# Patient Record
(demographics unavailable — no encounter records)

---

## 2024-10-31 NOTE — REASON FOR VISIT
[Initial Evaluation] : an initial evaluation for [Parents] : parents [[employer] ] : provided by [employer]   [FreeTextEntry2] : sleep apnea [Interpreters_IDNumber] : [id number]  [Interpreters_FullName] : Kaya  [TWNoteComboBox1] : Alvin

## 2024-10-31 NOTE — HISTORY OF PRESENT ILLNESS
[No Personal or Family History of Easy Bruising, Bleeding, or Issues with General Anesthesia] : No Personal or Family History of easy bruising, bleeding, or issues with general anesthesia [de-identified] : 10-31-24 12 year old female presents with sleep apnea  Referred by PULM Dr.Kristen Vaca-Maggie  States snoring at night, even in character, with pausing, gasping or choking.  Reports constant nasal congestion and mouth breathing  Has tried flonase for one month with no relief Denies witnessed apnea at night when sleeping.  There ARE concerns with enuresis.  There is no difficulty with hyperactivity/concentration.  Sleep study conducted 10/17/24 AHI 11.3 Thalassemia with mom and with patient.   CT sinuses 06/26/23 ([facility]) - inflammatory changes affecting the paranasal sinuses and their respective drainage pathways. Nasal septal deviation. Anatomic varients which may predisopose to pansinusitis.

## 2024-10-31 NOTE — REVIEW OF SYSTEMS
[Negative] : Heme/Lymph [de-identified] : as per HPI  [de-identified] : as per HPI  [de-identified] : as per HPI

## 2024-10-31 NOTE — ASSESSMENT
[FreeTextEntry1] : 12 year female Snoring and ATH on exam and MOHSEN on PSG with AHI  11.3  .  Discussed snoring vs UARS vs SDB vs MOHSEN.  Discussed that primary snoring is not harmful in and off itself but sleep apnea is different.  Although sometimes kids may grow out of MOHSEN, we recommend treating due to long term risk of quality of life issues, learning issues and, in severe cases, heart and lung problems.  Given current symptoms, findings on PSG and patient otherwise healthy would recommend considering adenotonsillectomy.   Discussed MOHSEN and risks, alternatives, and benefits of adenotonsillectomy including observation or CPAP.  Risks of adenotonsillectomy discussed including, but not limited to, bleeding, infection, scarring, voice changes, pain, dehydration, persistence of sleep apnea, and regrowth of adenoids.  Briefly discussed risk of anesthesia but they will discuss more in depth with the anesthesiologist the day of the procedure.  Parent agreed to proceed to surgery and this will be scheduled accordingly.  Sinus findings on CT mild. Headaches occasional.  No sinus surgery for now.  Will address ATH first.     Referral to [facility] for beta-thalassemia.   Plan: Tonsillectomy and Adenoidectomy (16144) [facility] OR Observation d/t Rodriguez Heme and PST clearance d/t severe MOHSEN and beta thalassemia.  RTC 3 months post op

## 2024-10-31 NOTE — PHYSICAL EXAM
[3+] : 3+ [Normal Gait and Station] : normal gait and station [Normal muscle strength, symmetry and tone of facial, head and neck musculature] : normal muscle strength, symmetry and tone of facial, head and neck musculature [Normal] : no cervical lymphadenopathy [Exposed Vessel] : left anterior vessel not exposed [Wheezing] : no wheezing [Increased Work of Breathing] : no increased work of breathing with use of accessory muscles and retractions

## 2024-10-31 NOTE — CONSULT LETTER
[Dear  ___] : Dear  [unfilled], [Consult Letter:] : I had the pleasure of evaluating your patient, [unfilled]. [Please see my note below.] : Please see my note below. [Consult Closing:] : Thank you very much for allowing me to participate in the care of this patient.  If you have any questions, please do not hesitate to contact me. [Sincerely,] : Sincerely, [FreeTextEntry2] : .Karissa Laura MD [FreeTextEntry3] : Luis Casillas MD  Pediatric Otolaryngology/ Head & Neck Surgery  [facility]  [address] [place] [address]  Tel [phone]  Fax [phone]

## 2024-12-02 NOTE — REASON FOR VISIT
[Routine Follow-Up] : a routine follow-up visit for [Sleep Apnea] : sleep apnea [Patient] : patient [Mother] : mother [Medical Records] : medical records [Patient Declined  Services] : - None: Patient declined  services

## 2024-12-02 NOTE — REVIEW OF SYSTEMS
[NI] : Allergic [Nl] : Endocrine [Snoring] : snoring [Anemia] : anemia [Short Stature] : short stature was noted [FreeTextEntry6] : asthma  [de-identified] : no menarche yet [de-identified] : on iron [de-identified] : on vit D, mother 4'10, dad 5'6

## 2024-12-02 NOTE — PHYSICAL EXAM
[Well Nourished] : well nourished [Well Developed] : well developed [Alert] : ~L alert [Active] : active [Normal Breathing Pattern] : normal breathing pattern [No Respiratory Distress] : no respiratory distress [No Allergic Shiners] : no allergic shiners [No Drainage] : no drainage [No Conjunctivitis] : no conjunctivitis [No Nasal Drainage] : no nasal drainage [No Sinus Tenderness] : no sinus tenderness [No Oral Pallor] : no oral pallor [No Oral Cyanosis] : no oral cyanosis [Non-Erythematous] : non-erythematous [No Exudates] : no exudates [Tonsil Size ___] : tonsil size [unfilled] [Absence Of Retractions] : absence of retractions [Symmetric] : symmetric [Good Expansion] : good expansion [No Acc Muscle Use] : no accessory muscle use [Good aeration to bases] : good aeration to bases [Equal Breath Sounds] : equal breath sounds bilaterally [No Crackles] : no crackles [No Rhonchi] : no rhonchi [No Wheezing] : no wheezing [Normal Sinus Rhythm] : normal sinus rhythm [No Heart Murmur] : no heart murmur [Soft, Non-Tender] : soft, non-tender [Non Distended] : was not ~L distended [Full ROM] : full range of motion [No Clubbing] : no clubbing [Capillary Refill < 2 secs] : capillary refill less than two seconds [No Cyanosis] : no cyanosis [No Petechiae] : no petechiae [No Contractures] : no contractures [Abnormal Walk] : normal gait [Alert and  Oriented] : alert and oriented [FreeTextEntry1] : hyponasal voice  [FreeTextEntry3] : external normal [FreeTextEntry5] : +post nasal drip visualized  [de-identified] : no visible rashes on exposed skin

## 2024-12-02 NOTE — HISTORY OF PRESENT ILLNESS
[FreeTextEntry1] : 13yo for f/u sleep.  NPSG (10/17/24):  oAHI: 11.3/hr (REM 25.7)  lowest sat: 78%  average saturation: 96.4%  highest TCO2:  54 mm Hg (no HV)   +mild snoring, PLMI: 0   Saw Dr Casillas 10/31/24, 3+ tonsils.  75% adenoids.  T&A planned     7/24 This is a 12 year old female for a sleep evaluation for snoring and trouble breathing when sleeping despite zyrtec, nasal spray.  h/o nasal septabl deviation repair, snoring returned after about 1 month.  Given zpack and prednisone for the sx but hasn't taken.    Sleep latency: no issues Wakenings: wakes from breathing Naps: no Daytime: yes-tiredness, no behavioral or academic problems , top of the class MOHSEN: +loud snoring- long standing, +pauses and gasping (saw on  RLS (screen): neg, ++restless, on iron for anemia Parasomnias: neg fhx: no MOHSEN   video, +pasues and recovery breaths  6/26/24 - pertinent recent labs Hb 10.5 10.5/33/5 (low) Cr 0.5 TSH 4.5 (above upper limit) HbA1c 5.6 (borderline)  CT sinus 6/23 no mention of adenoids

## 2024-12-02 NOTE — DATA REVIEWED
[FreeTextEntry1] : consult referral  video, +pasues and recovery breaths    6/26/24 - pertinent recent labs Hb 10.5 10.5/33/5 (low) Cr 0.5 TSH 4.5 (above upper limit) HbA1c 5.6 (borderline)  CT sinus 6/23 no mention of adenoids

## 2024-12-02 NOTE — CONSULT LETTER
[Dear  ___] : Dear  [unfilled], [Consult Letter:] : I had the pleasure of evaluating your patient, [unfilled]. [Please see my note below.] : Please see my note below. [Consult Closing:] : Thank you very much for allowing me to participate in the care of this patient.  If you have any questions, please do not hesitate to contact me. [Sincerely,] : Sincerely, [FreeTextEntry2] : Reji Garrett MD [FreeTextEntry3] : Yudelka Melton MD Director, [facility]- Pediatric Pulmonology [facility] [place] , Department of Pediatrics, [facility]  [DrPita  ___] : Dr. VASQUES

## 2024-12-20 NOTE — HISTORY OF PRESENT ILLNESS
[de-identified] : Stephanie is a 13y/o female, history of obstructive sleep apnea, presenting for clearance regarding "Beta Thalassemia" for tonsillectomy and adenoidectomy scheduled on 1/13/24. 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 Reports Stephanie was on oral iron about 6 months ago prescribed by the pediatrician, has not taken it in a few months. Denies epistaxis, hematuria or melena. She is premenstrual.   Recent fever about 2 days ago with headache and rhinorrhea, improved today.  Of note, lead level was high about one-year ago on routine BW done by PCP. Parents state that "someone came to the house" (likely ISMA) to evaluate lead. Lead level has since improved and is monitored by PCP.  She is taking vitamin D (unsure of dose) for vitamin D deficiency.   Mom is aware that she also has Beta Thalassemia trait and that Stephanie has Beta Thalassemia trait.

## 2024-12-20 NOTE — CONSULT LETTER
[Dear  ___] : Dear  [unfilled], [Consult Letter:] : I had the pleasure of evaluating your patient, [unfilled]. [Please see my note below.] : Please see my note below. [Consult Closing:] : Thank you very much for allowing me to participate in the care of this patient.  If you have any questions, please do not hesitate to contact me. [Sincerely,] : Sincerely, [FreeTextEntry2] : Dr. Garrett  [FreeTextEntry3] : Michelle Cruz CPNP Nurse Practitioner Pediatric Hematology [facility] [email or web address]

## 2024-12-20 NOTE — RESULTS/DATA
[FreeTextEntry1] : Peripheral blood smear reviewed with Dr. Baldwin:   RBCs: Mild hypochromia and microcytosis. Some target cells.   WBCs: Reactive lymphocytes present, demonstrating recent viral illness. No blasts visualized.   Platelets: Normal morphology.

## 2024-12-20 NOTE — PHYSICAL EXAM
[Cervical Lymph Nodes Enlarged Posterior Bilaterally] : posterior cervical [Cervical Lymph Nodes Enlarged Anterior Bilaterally] : anterior cervical [Normal] : affect appropriate

## 2024-12-20 NOTE — REASON FOR VISIT
[New Patient/Consultation] : a new patient/consultation for [Medical Records] : medical records [Parents] : parents [FreeTextEntry2] : "Beta Thalassemia Trait Hb Agenogi" [Interpreters_IDNumber] : [id number] [Interpreters_FullName] : Sherice

## 2024-12-20 NOTE — END OF VISIT
[Time Spent: ___ minutes] : I have spent [unfilled] minutes of time on the encounter which excludes teaching and separately reported services. [FreeTextEntry3] : Patient seen and discussed with NP.  Agree with A/P.

## 2025-06-20 NOTE — CONSULT LETTER
[Dear  ___] : Dear  [unfilled], [Consult Letter:] : I had the pleasure of evaluating your patient, [unfilled]. [Please see my note below.] : Please see my note below. [Consult Closing:] : Thank you very much for allowing me to participate in the care of this patient.  If you have any questions, please do not hesitate to contact me. [Sincerely,] : Sincerely, [FreeTextEntry2] : .Karissa Laura MD [FreeTextEntry3] : Luis Casillas MD  Pediatric Otolaryngology/ Head & Neck Surgery  [facility]  [address] [place] [address]  Tel [phone]  Fax [phone]

## 2025-06-20 NOTE — DATA REVIEWED
[FreeTextEntry1] : Parent used as independent historian given patient age and maturity.    The relevant medical records as well as any relevant testing, imaging, and other order results were independently reviewed and interpreted by me and discussed with family.

## 2025-06-20 NOTE — REASON FOR VISIT
[Post-Operative Visit] : a post-operative visit for [Mother] : mother [[employer] ] : provided by [employer]   [FreeTextEntry2] : sleep apnea [Interpreters_IDNumber] : [id number] [Interpreters_FullName] : Lisa [FreeTextEntry3] : Alvin [TWNoteComboBox1] : False

## 2025-06-20 NOTE — REVIEW OF SYSTEMS
[Negative] : Heme/Lymph [de-identified] : as per HPI  [de-identified] : as per HPI  [de-identified] : as per HPI

## 2025-06-20 NOTE — HISTORY OF PRESENT ILLNESS
[No Personal or Family History of Easy Bruising, Bleeding, or Issues with General Anesthesia] : No Personal or Family History of easy bruising, bleeding, or issues with general anesthesia [de-identified] : 6/20/25  No bleeding or infections reported postoperatively. Reports some pain and discomfort postoperatively - used Tylenol and Ibuprofen only. Steroids not taken. Tolerating PO. Snoring has improved with occasional mouth breathing, no pauses or gasping. Reports some nasal congestion. States sleeping much better. No food or liquids from the nose. No limited ROM to neck.  10-31-24 12 year old female presents with sleep apnea  Referred by PULM Dr.Kristen Laura  States snoring at night, even in character, with pausing, gasping or choking.  Reports constant nasal congestion and mouth breathing  Has tried flonase for one month with no relief Denies witnessed apnea at night when sleeping.  There ARE concerns with enuresis.  There is no difficulty with hyperactivity/concentration.  Sleep study conducted 10/17/24 AHI 11.3 Thalassemia with mom and with patient.   CT sinuses 06/26/23 ([facility]) - inflammatory changes affecting the paranasal sinuses and their respective drainage pathways. Nasal septal deviation. Anatomic varients which may predisopose to pansinusitis.

## 2025-06-20 NOTE — PHYSICAL EXAM
[Normal Gait and Station] : normal gait and station [Normal muscle strength, symmetry and tone of facial, head and neck musculature] : normal muscle strength, symmetry and tone of facial, head and neck musculature [Normal] : no cervical lymphadenopathy [Exposed Vessel] : left anterior vessel not exposed [Surgically Absent] : surgically absent [Wheezing] : no wheezing [Increased Work of Breathing] : no increased work of breathing with use of accessory muscles and retractions

## 2025-06-20 NOTE — ASSESSMENT
[FreeTextEntry1] : 13 year female s/p tonsillectomy and adenoidectomy. Discussed that adenoids can grow back and that we will monitor for now.  Any signs of recurrent nasal congestion or snoring they should let us know.  Tonsils do not grow back.  If persistent snoring sometimes will get repeat PSG.  Monitor for now.    RTC PRN